# Patient Record
Sex: FEMALE | Race: WHITE | NOT HISPANIC OR LATINO | Employment: OTHER | URBAN - METROPOLITAN AREA
[De-identification: names, ages, dates, MRNs, and addresses within clinical notes are randomized per-mention and may not be internally consistent; named-entity substitution may affect disease eponyms.]

---

## 2023-02-11 PROBLEM — K59.09 CHRONIC CONSTIPATION: Status: ACTIVE | Noted: 2023-02-11

## 2023-02-11 PROBLEM — B00.1 RECURRENT COLD SORES: Status: ACTIVE | Noted: 2023-02-11

## 2023-02-11 PROBLEM — K21.9 ACID REFLUX: Status: ACTIVE | Noted: 2023-02-11

## 2023-02-11 PROBLEM — Q66.89 CLAW TOE: Status: ACTIVE | Noted: 2023-02-11

## 2023-02-11 PROBLEM — I73.00 RAYNAUD'S SYNDROME: Status: ACTIVE | Noted: 2023-02-11

## 2023-02-11 PROBLEM — N32.81 OAB (OVERACTIVE BLADDER): Status: ACTIVE | Noted: 2023-02-11

## 2023-02-11 PROBLEM — M54.50 LUMBAR SPINE PAIN: Status: ACTIVE | Noted: 2023-02-11

## 2023-02-11 PROBLEM — G62.9 PERIPHERAL NEUROPATHY: Status: ACTIVE | Noted: 2023-02-11

## 2023-02-11 PROBLEM — R19.8 IRREGULAR BOWEL HABITS: Status: ACTIVE | Noted: 2023-02-11

## 2023-02-11 PROBLEM — R29.91 ABNORMAL FOOT FINDING: Status: ACTIVE | Noted: 2023-02-11

## 2023-02-11 PROBLEM — B37.2 CUTANEOUS CANDIDIASIS: Status: ACTIVE | Noted: 2023-02-11

## 2023-02-11 PROBLEM — L65.9 HAIR THINNING: Status: ACTIVE | Noted: 2023-02-11

## 2023-02-11 PROBLEM — F51.05 INSOMNIA DUE TO OTHER MENTAL DISORDER: Status: ACTIVE | Noted: 2023-02-11

## 2023-02-11 PROBLEM — M10.9 GOUT: Status: ACTIVE | Noted: 2023-02-11

## 2023-02-11 PROBLEM — R20.0 NUMBNESS OF RIGHT LOWER EXTREMITY: Status: ACTIVE | Noted: 2023-02-11

## 2023-02-11 PROBLEM — F99 INSOMNIA DUE TO OTHER MENTAL DISORDER: Status: ACTIVE | Noted: 2023-02-11

## 2023-02-11 PROBLEM — M19.90 ARTHRITIS: Status: ACTIVE | Noted: 2023-02-11

## 2023-02-11 PROBLEM — G35 MULTIPLE SCLEROSIS (MULTI): Status: ACTIVE | Noted: 2023-02-11

## 2023-02-11 PROBLEM — T14.8XXA BRUISING: Status: ACTIVE | Noted: 2023-02-11

## 2023-02-11 RX ORDER — ASPIRIN 81 MG/1
1 TABLET ORAL DAILY
COMMUNITY
Start: 2021-06-30

## 2023-02-11 RX ORDER — ESTRADIOL 0.1 MG/G
CREAM VAGINAL
COMMUNITY
Start: 2022-03-21

## 2023-02-11 RX ORDER — CELECOXIB 200 MG/1
200 CAPSULE ORAL
COMMUNITY
Start: 2021-06-30 | End: 2023-03-08 | Stop reason: DRUGHIGH

## 2023-02-11 RX ORDER — VALACYCLOVIR HYDROCHLORIDE 1 G/1
1 TABLET, FILM COATED ORAL DAILY
COMMUNITY
Start: 2021-06-30

## 2023-02-11 RX ORDER — NYSTATIN 100000 [USP'U]/G
POWDER TOPICAL
COMMUNITY
Start: 2020-10-12

## 2023-02-11 RX ORDER — PANTOPRAZOLE SODIUM 40 MG/1
40 TABLET, DELAYED RELEASE ORAL 2 TIMES DAILY
COMMUNITY
Start: 2021-06-30 | End: 2023-07-25

## 2023-02-11 RX ORDER — CALCIUM CITRATE/MAGNESIUM/D3 250 MG-200
WAFER ORAL
COMMUNITY
Start: 2021-06-30

## 2023-02-11 RX ORDER — POLYETHYLENE GLYCOL 3350 17 G/17G
POWDER, FOR SOLUTION ORAL
COMMUNITY
Start: 2022-09-12 | End: 2023-03-08 | Stop reason: SDUPTHER

## 2023-02-11 RX ORDER — CLONAZEPAM 1 MG/1
0.5 TABLET ORAL NIGHTLY
COMMUNITY
Start: 2021-06-30 | End: 2023-03-08 | Stop reason: SDUPTHER

## 2023-02-11 RX ORDER — MIRABEGRON 25 MG/1
1 TABLET, FILM COATED, EXTENDED RELEASE ORAL DAILY
COMMUNITY
Start: 2022-10-31 | End: 2023-05-04 | Stop reason: SDUPTHER

## 2023-02-11 RX ORDER — INTERFERON BETA-1A 30MCG/.5ML
30 KIT INTRAMUSCULAR
COMMUNITY
Start: 2021-07-09

## 2023-02-11 RX ORDER — PREGABALIN 75 MG/1
CAPSULE ORAL
COMMUNITY
Start: 2021-06-30 | End: 2023-04-18 | Stop reason: SDUPTHER

## 2023-02-11 RX ORDER — MULTIVIT WITH MINERALS/HERBS
TABLET ORAL
COMMUNITY
Start: 2021-06-30

## 2023-02-11 RX ORDER — NICARDIPINE HYDROCHLORIDE 20 MG/1
1 CAPSULE ORAL 3 TIMES DAILY
COMMUNITY
Start: 2021-06-30

## 2023-02-11 RX ORDER — AMITRIPTYLINE HYDROCHLORIDE 25 MG/1
1 TABLET, FILM COATED ORAL NIGHTLY
COMMUNITY
Start: 2021-06-30 | End: 2023-05-16

## 2023-03-08 ENCOUNTER — OFFICE VISIT (OUTPATIENT)
Dept: PRIMARY CARE | Facility: CLINIC | Age: 73
End: 2023-03-08
Payer: MEDICARE

## 2023-03-08 VITALS
RESPIRATION RATE: 16 BRPM | TEMPERATURE: 97.2 F | DIASTOLIC BLOOD PRESSURE: 78 MMHG | WEIGHT: 207 LBS | SYSTOLIC BLOOD PRESSURE: 124 MMHG | BODY MASS INDEX: 40.64 KG/M2 | HEART RATE: 64 BPM | HEIGHT: 60 IN | OXYGEN SATURATION: 97 %

## 2023-03-08 DIAGNOSIS — M10.012 IDIOPATHIC GOUT OF LEFT SHOULDER, UNSPECIFIED CHRONICITY: ICD-10-CM

## 2023-03-08 DIAGNOSIS — N32.81 OAB (OVERACTIVE BLADDER): ICD-10-CM

## 2023-03-08 DIAGNOSIS — K21.9 GASTROESOPHAGEAL REFLUX DISEASE, UNSPECIFIED WHETHER ESOPHAGITIS PRESENT: ICD-10-CM

## 2023-03-08 DIAGNOSIS — G35 MULTIPLE SCLEROSIS (MULTI): ICD-10-CM

## 2023-03-08 DIAGNOSIS — E66.01 OBESITY, MORBID (MULTI): ICD-10-CM

## 2023-03-08 DIAGNOSIS — F99 INSOMNIA DUE TO OTHER MENTAL DISORDER: ICD-10-CM

## 2023-03-08 DIAGNOSIS — B00.1 RECURRENT COLD SORES: ICD-10-CM

## 2023-03-08 DIAGNOSIS — B37.2 CUTANEOUS CANDIDIASIS: ICD-10-CM

## 2023-03-08 DIAGNOSIS — G63 POLYNEUROPATHY ASSOCIATED WITH UNDERLYING DISEASE (MULTI): Primary | ICD-10-CM

## 2023-03-08 DIAGNOSIS — I73.00 RAYNAUD'S DISEASE WITHOUT GANGRENE: ICD-10-CM

## 2023-03-08 DIAGNOSIS — F51.05 INSOMNIA DUE TO OTHER MENTAL DISORDER: ICD-10-CM

## 2023-03-08 PROCEDURE — 1036F TOBACCO NON-USER: CPT | Performed by: FAMILY MEDICINE

## 2023-03-08 PROCEDURE — 1160F RVW MEDS BY RX/DR IN RCRD: CPT | Performed by: FAMILY MEDICINE

## 2023-03-08 PROCEDURE — 1159F MED LIST DOCD IN RCRD: CPT | Performed by: FAMILY MEDICINE

## 2023-03-08 PROCEDURE — 99214 OFFICE O/P EST MOD 30 MIN: CPT | Performed by: FAMILY MEDICINE

## 2023-03-08 RX ORDER — CELECOXIB 200 MG/1
200 CAPSULE ORAL DAILY
Qty: 90 CAPSULE | Refills: 0 | Status: SHIPPED | OUTPATIENT
Start: 2023-03-08 | End: 2023-06-06

## 2023-03-08 RX ORDER — CLOTRIMAZOLE AND BETAMETHASONE DIPROPIONATE 10; .64 MG/G; MG/G
CREAM TOPICAL
COMMUNITY
Start: 2023-01-03 | End: 2023-04-12 | Stop reason: ALTCHOICE

## 2023-03-08 RX ORDER — FLUCONAZOLE 100 MG/1
100 TABLET ORAL DAILY
Qty: 30 TABLET | Refills: 0 | Status: SHIPPED | OUTPATIENT
Start: 2023-03-08 | End: 2023-04-04 | Stop reason: ALTCHOICE

## 2023-03-08 RX ORDER — MULTIVITAMIN
1 TABLET ORAL DAILY
COMMUNITY
End: 2023-03-08

## 2023-03-08 ASSESSMENT — PATIENT HEALTH QUESTIONNAIRE - PHQ9
2. FEELING DOWN, DEPRESSED OR HOPELESS: NOT AT ALL
1. LITTLE INTEREST OR PLEASURE IN DOING THINGS: NOT AT ALL
SUM OF ALL RESPONSES TO PHQ9 QUESTIONS 1 AND 2: 0

## 2023-03-08 ASSESSMENT — ENCOUNTER SYMPTOMS
JOINT SWELLING: 0
VOMITING: 0
FLANK PAIN: 0
UNEXPECTED WEIGHT CHANGE: 0
FEVER: 0
TROUBLE SWALLOWING: 0
DIARRHEA: 0
APPETITE CHANGE: 0
NERVOUS/ANXIOUS: 0
WOUND: 0
DEPRESSION: 0
OCCASIONAL FEELINGS OF UNSTEADINESS: 0
DIZZINESS: 0
EYE ITCHING: 0
SINUS PAIN: 0
ACTIVITY CHANGE: 0
POLYDIPSIA: 0
SHORTNESS OF BREATH: 0
COLOR CHANGE: 0
CHILLS: 0
HEMATURIA: 0
COUGH: 0
FATIGUE: 0
SINUS PRESSURE: 0
SORE THROAT: 0
AGITATION: 0
FACIAL SWELLING: 0
PALPITATIONS: 0
MYALGIAS: 0
CONSTIPATION: 0
FREQUENCY: 0
EYE PAIN: 0
POLYPHAGIA: 0
LOSS OF SENSATION IN FEET: 0
CHEST TIGHTNESS: 0
BACK PAIN: 0
ADENOPATHY: 0
NECK STIFFNESS: 0
ABDOMINAL PAIN: 0
NAUSEA: 0
BLOOD IN STOOL: 0
WHEEZING: 0
RHINORRHEA: 0
SLEEP DISTURBANCE: 0
DIAPHORESIS: 0
VOICE CHANGE: 0
DYSURIA: 0
EYE DISCHARGE: 0
BRUISES/BLEEDS EASILY: 0
EYE REDNESS: 0
ARTHRALGIAS: 0

## 2023-03-08 NOTE — PROGRESS NOTES
Subjective   Patient ID: Olga Bennett is a 72 y.o. female who presents for Follow-up (Review medications).  Today she is accompanied by accompanied by spouse.     HPI    Review of Systems   Constitutional:  Negative for activity change, appetite change, chills, diaphoresis, fatigue, fever and unexpected weight change.   HENT:  Negative for congestion, dental problem, drooling, ear discharge, ear pain, facial swelling, hearing loss, nosebleeds, postnasal drip, rhinorrhea, sinus pressure, sinus pain, sneezing, sore throat, tinnitus, trouble swallowing and voice change.    Eyes:  Negative for pain, discharge, redness, itching and visual disturbance.   Respiratory:  Negative for cough, chest tightness, shortness of breath and wheezing.    Cardiovascular:  Negative for chest pain, palpitations and leg swelling.   Gastrointestinal:  Negative for abdominal pain, blood in stool, constipation, diarrhea, nausea and vomiting.   Endocrine: Negative for cold intolerance, heat intolerance, polydipsia, polyphagia and polyuria.   Genitourinary:  Negative for decreased urine volume, dysuria, flank pain, frequency, hematuria and urgency.   Musculoskeletal:  Negative for arthralgias, back pain, gait problem, joint swelling, myalgias and neck stiffness.   Skin:  Negative for color change, pallor, rash and wound.   Neurological:  Negative for dizziness.   Hematological:  Negative for adenopathy. Does not bruise/bleed easily.   Psychiatric/Behavioral:  Negative for agitation, behavioral problems and sleep disturbance. The patient is not nervous/anxious.        Objective   /78 (BP Location: Left arm, Patient Position: Sitting, BP Cuff Size: Adult)   Pulse 64   Temp 36.2 °C (97.2 °F)   Resp 16   Ht 1.524 m (5')   Wt 93.9 kg (207 lb)   SpO2 97%   BMI 40.43 kg/m²   BSA: 1.99 meters squared  Growth percentiles: Facility age limit for growth %kamran is 20 years. Facility age limit for growth %kamran is 20 years.     Physical  Exam  Vitals and nursing note reviewed.   Constitutional:       General: She is not in acute distress.     Appearance: Normal appearance. She is normal weight. She is not ill-appearing or toxic-appearing.   HENT:      Head: Normocephalic.      Right Ear: Tympanic membrane, ear canal and external ear normal. There is no impacted cerumen.      Left Ear: Tympanic membrane, ear canal and external ear normal. There is no impacted cerumen.      Nose: Nose normal. No congestion or rhinorrhea.      Mouth/Throat:      Mouth: Mucous membranes are moist.      Pharynx: Oropharynx is clear. No oropharyngeal exudate or posterior oropharyngeal erythema.   Eyes:      General: No scleral icterus.        Right eye: No discharge.         Left eye: No discharge.      Extraocular Movements: Extraocular movements intact.      Conjunctiva/sclera: Conjunctivae normal.      Pupils: Pupils are equal, round, and reactive to light.   Neck:      Vascular: No carotid bruit.   Cardiovascular:      Rate and Rhythm: Normal rate and regular rhythm.      Pulses: Normal pulses.      Heart sounds: Normal heart sounds. No murmur heard.     No gallop.   Pulmonary:      Effort: No respiratory distress.      Breath sounds: No wheezing or rhonchi.   Chest:      Chest wall: No tenderness.   Abdominal:      General: Abdomen is flat. Bowel sounds are normal.      Palpations: Abdomen is soft. There is no mass.      Tenderness: There is no abdominal tenderness. There is no guarding or rebound.      Hernia: No hernia is present.   Musculoskeletal:         General: No swelling or tenderness. Normal range of motion.      Cervical back: Normal range of motion. No rigidity or tenderness.      Right lower leg: No edema.      Left lower leg: No edema.   Lymphadenopathy:      Cervical: No cervical adenopathy.   Skin:     General: Skin is warm and dry.      Coloration: Skin is not pale.      Findings: No bruising, erythema or rash.   Neurological:      General: No focal  deficit present.      Mental Status: She is alert and oriented to person, place, and time.      Sensory: No sensory deficit.      Coordination: Coordination normal.      Gait: Gait normal.      Deep Tendon Reflexes: Reflexes normal.   Psychiatric:         Mood and Affect: Mood normal.         Behavior: Behavior normal.         Thought Content: Thought content normal.         Judgment: Judgment normal.         Assessment/Plan   Problem List Items Addressed This Visit       Acid reflux    Cutaneous candidiasis    Insomnia due to other mental disorder    Multiple sclerosis (CMS/[facility])    OAB (overactive bladder)    Peripheral neuropathy - Primary    Raynaud's syndrome    Recurrent cold sores    Gout    Obesity, morbid (CMS/[facility])

## 2023-03-14 DIAGNOSIS — G62.9 PERIPHERAL POLYNEUROPATHY: ICD-10-CM

## 2023-03-14 DIAGNOSIS — G35 MULTIPLE SCLEROSIS (MULTI): ICD-10-CM

## 2023-03-27 ENCOUNTER — PATIENT OUTREACH (OUTPATIENT)
Dept: PRIMARY CARE | Facility: CLINIC | Age: 73
End: 2023-03-27
Payer: MEDICARE

## 2023-03-27 DIAGNOSIS — G35 MULTIPLE SCLEROSIS (MULTI): ICD-10-CM

## 2023-03-27 DIAGNOSIS — G63 POLYNEUROPATHY ASSOCIATED WITH UNDERLYING DISEASE (MULTI): ICD-10-CM

## 2023-03-27 NOTE — PROGRESS NOTES
[facility] call with patient. Discussed medical diagnoses and any current issues. Introduced the program and explained my role in the office. Patient does have MS and peripheral neuropathy. She is doing much better working with pain management and her pain is almost gone. She has not had a flare up of her MS in 4-5 years. Was diagnosed back in 1995/96. Patient is independent with ADL's. She is not having issues other than waiting on hold whenever she calls the . Also she has issues with getting medication refills. It takes days sometimes and she doesn't hear back as to whether this was addressed. I looked at her insurance and it appears she would have a $15 copay a month. In my opinion, this is more of an office workflow issue vs a chronic care issue. She can certainly keep my number in case any other needs arise, but at this time we agreed she would not consent.

## 2023-03-30 ENCOUNTER — TELEPHONE (OUTPATIENT)
Dept: PRIMARY CARE | Facility: CLINIC | Age: 73
End: 2023-03-30
Payer: MEDICARE

## 2023-04-04 DIAGNOSIS — R21 RASH: ICD-10-CM

## 2023-04-04 RX ORDER — HYDROXYZINE HYDROCHLORIDE 25 MG/1
25 TABLET, FILM COATED ORAL EVERY 6 HOURS PRN
Qty: 30 TABLET | Refills: 0 | Status: CANCELLED | OUTPATIENT
Start: 2023-04-04 | End: 2023-05-04

## 2023-04-04 RX ORDER — HYDROXYZINE HYDROCHLORIDE 25 MG/1
25 TABLET, FILM COATED ORAL EVERY 6 HOURS PRN
Qty: 30 TABLET | Refills: 0 | Status: SHIPPED | OUTPATIENT
Start: 2023-04-04 | End: 2023-04-12 | Stop reason: ALTCHOICE

## 2023-04-04 RX ORDER — PREDNISONE 20 MG/1
20 TABLET ORAL DAILY
Qty: 7 TABLET | Refills: 0 | Status: CANCELLED | OUTPATIENT
Start: 2023-04-04 | End: 2023-04-11

## 2023-04-04 RX ORDER — PREDNISONE 20 MG/1
20 TABLET ORAL DAILY
Qty: 7 TABLET | Refills: 0 | Status: SHIPPED | OUTPATIENT
Start: 2023-04-04 | End: 2023-04-12 | Stop reason: ALTCHOICE

## 2023-04-05 NOTE — TELEPHONE ENCOUNTER
Sent My Chart message to pt to contact office if still having issues.  Has appt next week in office

## 2023-04-11 ENCOUNTER — CLINICAL SUPPORT (OUTPATIENT)
Dept: PRIMARY CARE | Facility: CLINIC | Age: 73
End: 2023-04-11
Payer: MEDICARE

## 2023-04-11 DIAGNOSIS — Z79.899 POLYPHARMACY: Primary | ICD-10-CM

## 2023-04-11 DIAGNOSIS — G35 MULTIPLE SCLEROSIS (MULTI): ICD-10-CM

## 2023-04-11 NOTE — PROGRESS NOTES
Subjective   Patient ID: Olga Bennett is a 72 y.o. female who presents for polypharmacy.    Referring Provider: Wendy Meraz MD     Patient was referred by PCP at most recent visit for review of medications. She is on several meds and may benefit from drug holiday/discontinuation of medications. She mentions to me that she may not notice significant differences while taking biotin, turmeric. She has tried in the past to cut out pantoprazole but GERD is too painful when stopping. She has pain relief from celebrex and lyrica. She is not sure whether the sleep benefit of amitriptyline is noticeable. No other concerns at this time, not on atarax and prednisone.     Objective     There were no vitals taken for this visit.     Labs  Lab Results   Component Value Date    BILITOT 0.4 12/12/2022    CALCIUM 10.3 12/12/2022    CO2 27 12/12/2022     12/12/2022    CREATININE 0.83 12/12/2022    GLUCOSE 99 12/12/2022    ALKPHOS 60 12/12/2022    K 4.6 12/12/2022    PROT 7.7 12/12/2022     12/12/2022    AST 42 (H) 12/12/2022    ALT 51 (H) 12/12/2022    BUN 18 12/12/2022    ANIONGAP 13 12/12/2022    MG 1.73 06/27/2022    ALBUMIN 4.6 12/12/2022    GFRF 75 12/12/2022     Lab Results   Component Value Date    TRIG 161 (H) 06/27/2022    CHOL 163 06/27/2022    HDL 50.8 06/27/2022     No results found for: HGBA1C    Current Outpatient Medications on File Prior to Visit   Medication Sig Dispense Refill    amitriptyline (Elavil) 25 mg tablet Take 1 tablet (25 mg) by mouth once daily at bedtime.      aspirin 81 mg EC tablet Take 1 tablet (81 mg) by mouth once daily.      BIOTIN ORAL Take 1 tablet by mouth 1 (one) time each day. 7500 MCG      calcium citrate/magnesium/D3 (seble citrate-mag ox,aspart-D3) 250 mg-125 mg- 200 unit wafer Calcium-Magnesium-Vitamin D 250-125-200 MG-MG-UNIT Oral Wafer   Refills: 0        Brendon ARTEAGA-Mag HORACIOgie   Start : 30-Jun-2021   Active      celecoxib (CeleBREX) 200 mg capsule Take 1 capsule (200  mg) by mouth once daily. 90 capsule 0    estradiol (Estrace) 0.01 % (0.1 mg/gram) vaginal cream Estradiol 0.1 MG/GM Vaginal Cream   Quantity: 42  Refills: 0        Start : 21-Mar-2022   Active      interferon beta-1a (Avonex) 30 mcg/0.5 mL injection Inject 0.5 mL (30 mcg) into the shoulder, thigh, or buttocks 1 (one) time per week. For MS      mirabegron (Mybetriq) 25 mg tablet extended release 24 hr 24 hr tablet Take 1 tablet (25 mg) by mouth once daily.      niCARdipine (Cardene) 20 mg capsule Take 1 capsule (20 mg) by mouth in the morning and 1 capsule (20 mg) in the evening and 1 capsule (20 mg) before bedtime.      nystatin (Mycostatin) 100,000 unit/gram powder APPLY 2-3 TIMES DAILY TO AFFECTED AREA(S) PRN      pantoprazole (ProtoNix) 40 mg EC tablet Take 1 tablet (40 mg) by mouth in the morning and 1 tablet (40 mg) in the evening. 30 minutes before breakfast and dinner..      pregabalin (Lyrica) 75 mg capsule Take 1 capsule with breakfast, lunch and dinner and 2 capsules at bedtime.      TURMERIC ORAL Turmeric Curcumin Oral Capsule   Refills: 0        Graciela Cohen   Start : 30-Jun-2021   Active      valACYclovir (Valtrex) 1 gram tablet Take 1 tablet (1,000 mg) by mouth once daily.      vitamin B complex tablet Super B-Complex Oral Tablet   Refills: 0        Graciela Cohen   Start : 30-Jun-2021   Active      clotrimazole-betamethasone (Lotrisone) cream       hydrOXYzine HCL (Atarax) 25 mg tablet Take 1 tablet (25 mg) by mouth every 6 hours if needed for itching. (Patient not taking: Reported on 4/11/2023) 30 tablet 0    predniSONE (Deltasone) 20 mg tablet Take 1 tablet (20 mg) by mouth once daily for 7 doses. 7 tablet 0     No current facility-administered medications on file prior to visit.       Assessment/Plan   Problem List Items Addressed This Visit       Multiple sclerosis (CMS/HCC)    Polypharmacy - Primary     Medications that may be beneficial to discontinue and wean  off:  Amitriptyline 25 mg tabs: Patient is using for sleep. She would consider discontinuing this, would be open to cutting in half x 2 weeks and if tolerating, discontinuing alltogether after this  Biotin oral caps: Patient to trial stop taking  Hydroxyzine 25 mg tabs: Patient is not taking at this time  Prednisone 20 mg tabs: Patient is not taking at this time  Turmeric oral: Patient is not noticing significant benefit of this medication, plans on doing a drug holiday to see if she notices any change in pain  2.   Medications such as celecoxib, pantoprazole and Lyrica are all meds patient mentions that benefit her more than stopping and would like to continue  3.   Continue all other meds at this time  4.   Pharmacy follow-up as needed, patient provided number.    Thanks,  Emmanuel Estrada, PharmD

## 2023-04-12 ENCOUNTER — OFFICE VISIT (OUTPATIENT)
Dept: PRIMARY CARE | Facility: CLINIC | Age: 73
End: 2023-04-12
Payer: MEDICARE

## 2023-04-12 VITALS
DIASTOLIC BLOOD PRESSURE: 77 MMHG | HEART RATE: 80 BPM | WEIGHT: 206.8 LBS | SYSTOLIC BLOOD PRESSURE: 132 MMHG | RESPIRATION RATE: 18 BRPM | OXYGEN SATURATION: 96 % | HEIGHT: 61 IN | BODY MASS INDEX: 39.04 KG/M2

## 2023-04-12 DIAGNOSIS — Z71.89 CARDIAC RISK COUNSELING: ICD-10-CM

## 2023-04-12 DIAGNOSIS — F33.8 SEASONAL AFFECTIVE DISORDER (CMS-HCC): ICD-10-CM

## 2023-04-12 DIAGNOSIS — Z72.89 OTHER PROBLEMS RELATED TO LIFESTYLE: ICD-10-CM

## 2023-04-12 DIAGNOSIS — Z13.220 SCREENING FOR HYPERLIPIDEMIA: ICD-10-CM

## 2023-04-12 DIAGNOSIS — F33.9 CHRONIC RECURRENT MAJOR DEPRESSIVE DISORDER (CMS-HCC): ICD-10-CM

## 2023-04-12 DIAGNOSIS — Z13.6 SCREENING FOR CARDIOVASCULAR CONDITION: ICD-10-CM

## 2023-04-12 DIAGNOSIS — Z12.11 SCREENING FOR COLORECTAL CANCER: ICD-10-CM

## 2023-04-12 DIAGNOSIS — G35 MULTIPLE SCLEROSIS (MULTI): ICD-10-CM

## 2023-04-12 DIAGNOSIS — E66.01 CLASS 2 SEVERE OBESITY WITH SERIOUS COMORBIDITY AND BODY MASS INDEX (BMI) OF 39.0 TO 39.9 IN ADULT, UNSPECIFIED OBESITY TYPE (MULTI): ICD-10-CM

## 2023-04-12 DIAGNOSIS — E66.01 CLASS 2 SEVERE OBESITY DUE TO EXCESS CALORIES WITH SERIOUS COMORBIDITY AND BODY MASS INDEX (BMI) OF 39.0 TO 39.9 IN ADULT (MULTI): ICD-10-CM

## 2023-04-12 DIAGNOSIS — Z12.12 SCREENING FOR COLORECTAL CANCER: ICD-10-CM

## 2023-04-12 DIAGNOSIS — Z00.00 ROUTINE GENERAL MEDICAL EXAMINATION AT HEALTH CARE FACILITY: Primary | ICD-10-CM

## 2023-04-12 DIAGNOSIS — Z23 NEED FOR VACCINATION: ICD-10-CM

## 2023-04-12 DIAGNOSIS — Z13.89 SCREENING FOR MULTIPLE CONDITIONS: ICD-10-CM

## 2023-04-12 DIAGNOSIS — Z11.59 NEED FOR HEPATITIS C SCREENING TEST: ICD-10-CM

## 2023-04-12 PROBLEM — T14.8XXA BRUISING: Status: RESOLVED | Noted: 2023-02-11 | Resolved: 2023-04-12

## 2023-04-12 PROBLEM — G62.9 PERIPHERAL NEUROPATHY: Status: RESOLVED | Noted: 2023-02-11 | Resolved: 2023-04-12

## 2023-04-12 PROBLEM — R19.8 IRREGULAR BOWEL HABITS: Status: RESOLVED | Noted: 2023-02-11 | Resolved: 2023-04-12

## 2023-04-12 PROBLEM — N31.9 NEUROGENIC DYSFUNCTION OF THE URINARY BLADDER: Status: ACTIVE | Noted: 2023-04-12

## 2023-04-12 PROBLEM — G57.93 NEUROPATHY INVOLVING BOTH LOWER EXTREMITIES: Status: ACTIVE | Noted: 2023-04-12

## 2023-04-12 PROBLEM — B37.2 CUTANEOUS CANDIDIASIS: Status: RESOLVED | Noted: 2023-02-11 | Resolved: 2023-04-12

## 2023-04-12 PROBLEM — M62.81 MUSCLE WEAKNESS: Status: ACTIVE | Noted: 2023-04-12

## 2023-04-12 PROBLEM — G89.29 CHRONIC BILATERAL LOW BACK PAIN WITH BILATERAL SCIATICA: Status: ACTIVE | Noted: 2023-02-11

## 2023-04-12 PROBLEM — R29.91 ABNORMAL FOOT FINDING: Status: RESOLVED | Noted: 2023-02-11 | Resolved: 2023-04-12

## 2023-04-12 PROBLEM — M54.42 CHRONIC BILATERAL LOW BACK PAIN WITH BILATERAL SCIATICA: Status: ACTIVE | Noted: 2023-02-11

## 2023-04-12 PROBLEM — R20.2 PARESTHESIA: Status: RESOLVED | Noted: 2023-04-12 | Resolved: 2023-04-12

## 2023-04-12 PROBLEM — F51.05 INSOMNIA DUE TO OTHER MENTAL DISORDER: Status: RESOLVED | Noted: 2023-02-11 | Resolved: 2023-04-12

## 2023-04-12 PROBLEM — L65.9 HAIR THINNING: Status: RESOLVED | Noted: 2023-02-11 | Resolved: 2023-04-12

## 2023-04-12 PROBLEM — M62.81 MUSCLE WEAKNESS: Status: RESOLVED | Noted: 2023-04-12 | Resolved: 2023-04-12

## 2023-04-12 PROBLEM — R26.0 ATAXIC GAIT: Status: RESOLVED | Noted: 2023-04-12 | Resolved: 2023-04-12

## 2023-04-12 PROBLEM — M54.41 CHRONIC BILATERAL LOW BACK PAIN WITH BILATERAL SCIATICA: Status: ACTIVE | Noted: 2023-02-11

## 2023-04-12 PROBLEM — F32.9 MAJOR DEPRESSION, SINGLE EPISODE: Status: ACTIVE | Noted: 2023-04-12

## 2023-04-12 PROBLEM — M1A.09X0 IDIOPATHIC CHRONIC GOUT OF MULTIPLE SITES WITHOUT TOPHUS: Status: ACTIVE | Noted: 2023-02-11

## 2023-04-12 PROBLEM — R20.0 NUMBNESS OF RIGHT LOWER EXTREMITY: Status: RESOLVED | Noted: 2023-02-11 | Resolved: 2023-04-12

## 2023-04-12 PROBLEM — R20.2 PARESTHESIA: Status: ACTIVE | Noted: 2023-04-12

## 2023-04-12 PROBLEM — F99 INSOMNIA DUE TO OTHER MENTAL DISORDER: Status: RESOLVED | Noted: 2023-02-11 | Resolved: 2023-04-12

## 2023-04-12 PROBLEM — R27.0 ATAXIA: Status: RESOLVED | Noted: 2023-04-12 | Resolved: 2023-04-12

## 2023-04-12 PROBLEM — R26.0 ATAXIC GAIT: Status: ACTIVE | Noted: 2023-04-12

## 2023-04-12 PROBLEM — R27.0 ATAXIA: Status: ACTIVE | Noted: 2023-04-12

## 2023-04-12 LAB
ANION GAP IN SER/PLAS: 11 MMOL/L (ref 10–20)
CALCIUM (MG/DL) IN SER/PLAS: 9.2 MG/DL (ref 8.6–10.3)
CARBON DIOXIDE, TOTAL (MMOL/L) IN SER/PLAS: 27 MMOL/L (ref 21–32)
CHLORIDE (MMOL/L) IN SER/PLAS: 107 MMOL/L (ref 98–107)
CREATININE (MG/DL) IN SER/PLAS: 0.8 MG/DL (ref 0.5–1.05)
GFR FEMALE: 78 ML/MIN/1.73M2
GLUCOSE (MG/DL) IN SER/PLAS: 90 MG/DL (ref 74–99)
POTASSIUM (MMOL/L) IN SER/PLAS: 3.7 MMOL/L (ref 3.5–5.3)
SODIUM (MMOL/L) IN SER/PLAS: 141 MMOL/L (ref 136–145)
URATE (MG/DL) IN SER/PLAS: 6.3 MG/DL (ref 2.3–6.7)
UREA NITROGEN (MG/DL) IN SER/PLAS: 20 MG/DL (ref 6–23)

## 2023-04-12 PROCEDURE — 99497 ADVNCD CARE PLAN 30 MIN: CPT | Performed by: FAMILY MEDICINE

## 2023-04-12 PROCEDURE — G0009 ADMIN PNEUMOCOCCAL VACCINE: HCPCS | Performed by: FAMILY MEDICINE

## 2023-04-12 PROCEDURE — 90677 PCV20 VACCINE IM: CPT | Performed by: FAMILY MEDICINE

## 2023-04-12 PROCEDURE — 93000 ELECTROCARDIOGRAM COMPLETE: CPT | Performed by: FAMILY MEDICINE

## 2023-04-12 PROCEDURE — G0439 PPPS, SUBSEQ VISIT: HCPCS | Performed by: FAMILY MEDICINE

## 2023-04-12 PROCEDURE — 1159F MED LIST DOCD IN RCRD: CPT | Performed by: FAMILY MEDICINE

## 2023-04-12 PROCEDURE — 1160F RVW MEDS BY RX/DR IN RCRD: CPT | Performed by: FAMILY MEDICINE

## 2023-04-12 PROCEDURE — 1036F TOBACCO NON-USER: CPT | Performed by: FAMILY MEDICINE

## 2023-04-12 PROCEDURE — 3008F BODY MASS INDEX DOCD: CPT | Performed by: FAMILY MEDICINE

## 2023-04-12 PROCEDURE — 1170F FXNL STATUS ASSESSED: CPT | Performed by: FAMILY MEDICINE

## 2023-04-12 RX ORDER — CLONAZEPAM 1 MG/1
1 TABLET ORAL DAILY
COMMUNITY
Start: 2017-01-10 | End: 2023-05-04 | Stop reason: ALTCHOICE

## 2023-04-12 ASSESSMENT — ENCOUNTER SYMPTOMS
EYE DISCHARGE: 0
DEPRESSION: 0
MYALGIAS: 1
SINUS PAIN: 0
CONSTIPATION: 1
POLYDIPSIA: 1
NUMBNESS: 1
ARTHRALGIAS: 1
HEMATURIA: 0
BACK PAIN: 1
VOMITING: 0
AGITATION: 0
COUGH: 0
OCCASIONAL FEELINGS OF UNSTEADINESS: 0
EYE ITCHING: 0
JOINT SWELLING: 0
FLANK PAIN: 0
DYSURIA: 0
SORE THROAT: 0
BRUISES/BLEEDS EASILY: 0
FATIGUE: 0
HEADACHES: 0
COLOR CHANGE: 0
APPETITE CHANGE: 0
LIGHT-HEADEDNESS: 0
FREQUENCY: 0
DIZZINESS: 0
POLYPHAGIA: 0
WOUND: 0
ACTIVITY CHANGE: 0
BLOOD IN STOOL: 0
TROUBLE SWALLOWING: 0
PALPITATIONS: 0
NAUSEA: 0
SLEEP DISTURBANCE: 0
DIARRHEA: 0
NERVOUS/ANXIOUS: 0
CHEST TIGHTNESS: 0
DIAPHORESIS: 0
RHINORRHEA: 0
VOICE CHANGE: 1
NECK STIFFNESS: 0
SHORTNESS OF BREATH: 0
FACIAL SWELLING: 0
EYE PAIN: 0
LOSS OF SENSATION IN FEET: 1
CHILLS: 0
UNEXPECTED WEIGHT CHANGE: 0
ADENOPATHY: 0
ABDOMINAL PAIN: 0
FEVER: 0
SINUS PRESSURE: 0
WHEEZING: 0
EYE REDNESS: 0

## 2023-04-12 ASSESSMENT — PATIENT HEALTH QUESTIONNAIRE - PHQ9
1. LITTLE INTEREST OR PLEASURE IN DOING THINGS: NOT AT ALL
2. FEELING DOWN, DEPRESSED OR HOPELESS: NOT AT ALL
SUM OF ALL RESPONSES TO PHQ9 QUESTIONS 1 & 2: 0

## 2023-04-12 ASSESSMENT — ACTIVITIES OF DAILY LIVING (ADL)
BATHING: INDEPENDENT
MANAGING_FINANCES: INDEPENDENT
DRESSING: INDEPENDENT
GROCERY_SHOPPING: INDEPENDENT
TAKING_MEDICATION: INDEPENDENT
DOING_HOUSEWORK: INDEPENDENT

## 2023-04-12 NOTE — PROGRESS NOTES
Subjective   Reason for Visit: Olga Bennett is an 72 y.o. female here for a Medicare Wellness visit.     Past Medical, Surgical, and Family History reviewed and updated in chart.    Reviewed all medications by prescribing practitioner or clinical pharmacist (such as prescriptions, OTCs, herbal therapies and supplements) and documented in the medical record.    HPI    Patient Care Team:  Wendy Meraz MD as PCP - General  Wendy Meraz MD as PCP - [employer]age PCP   Advance Care Planning Note     Discussion Date: 04/12/23   Discussion Participants: patient    The patient wishes to discuss Advance Care Planning today and the following is a brief summary of our discussion.     Patient has capacity to make their own medical decisions: Yes  Health Care Agent/Surrogate Decision Maker documented in chart: Yes    Documents on file and valid:  Advance Directive/Living Will: Yes   Health Care Power of : Yes  Patient is a DNR CR    Communication of Medical Status/Pr  ognosis:   good     Communication of Treatment Goals/Options:   good     Treatment Decisions  Discussed and agree with her decision.    Time Statement: Total face to face time spent on advance care planning was 16 minutes with 16 minutes spent in counseling, including the explanation.    Wendy Meraz MD   Review of Systems   Constitutional:  Negative for activity change, appetite change, chills, diaphoresis, fatigue, fever and unexpected weight change.   HENT:  Positive for voice change. Negative for congestion, dental problem, drooling, ear discharge, ear pain, facial swelling, hearing loss, nosebleeds, postnasal drip, rhinorrhea, sinus pressure, sinus pain, sneezing, sore throat, tinnitus and trouble swallowing.         Some hoarseness in voice    Eyes:  Negative for pain, discharge, redness, itching and visual disturbance.   Respiratory:  Negative for cough, chest tightness, shortness of breath and wheezing.    Cardiovascular:   "Negative for chest pain, palpitations and leg swelling.   Gastrointestinal:  Positive for constipation. Negative for abdominal pain, blood in stool, diarrhea, nausea and vomiting.   Endocrine: Positive for polydipsia. Negative for cold intolerance, heat intolerance, polyphagia and polyuria.   Genitourinary:  Negative for decreased urine volume, dysuria, flank pain, frequency, hematuria and urgency.   Musculoskeletal:  Positive for arthralgias, back pain and myalgias. Negative for gait problem, joint swelling and neck stiffness.   Skin:  Positive for rash. Negative for color change, pallor and wound.        Neck rash - improving with use of Prednisone   Neurological:  Positive for numbness. Negative for dizziness, light-headedness and headaches.        Paresthesias in bilateral lower extremities R>L   Hematological:  Negative for adenopathy. Does not bruise/bleed easily.   Psychiatric/Behavioral:  Negative for agitation, behavioral problems and sleep disturbance. The patient is not nervous/anxious.          Objective   Vitals:  /77   Pulse 80   Resp 18   Ht 1.537 m (5' 0.5\")   Wt 93.8 kg (206 lb 12.8 oz)   SpO2 96%   BMI 39.72 kg/m²       Physical Exam  Vitals and nursing note reviewed.   Constitutional:       General: She is not in acute distress.     Appearance: Normal appearance. She is obese. She is not ill-appearing or toxic-appearing.   HENT:      Head: Normocephalic.      Right Ear: Tympanic membrane, ear canal and external ear normal. There is no impacted cerumen.      Left Ear: Tympanic membrane, ear canal and external ear normal. There is no impacted cerumen.      Nose: Nose normal. No congestion or rhinorrhea.      Mouth/Throat:      Mouth: Mucous membranes are moist.      Pharynx: Oropharynx is clear. No oropharyngeal exudate or posterior oropharyngeal erythema.   Eyes:      General: No scleral icterus.        Right eye: No discharge.         Left eye: No discharge.      Extraocular Movements: " Extraocular movements intact.      Conjunctiva/sclera: Conjunctivae normal.      Pupils: Pupils are equal, round, and reactive to light.   Neck:      Vascular: No carotid bruit.   Cardiovascular:      Rate and Rhythm: Normal rate and regular rhythm.      Pulses: Normal pulses.      Heart sounds: Normal heart sounds. No murmur heard.     No friction rub. No gallop.   Pulmonary:      Effort: Pulmonary effort is normal. No respiratory distress.      Breath sounds: Normal breath sounds. No stridor. No wheezing, rhonchi or rales.   Chest:      Chest wall: No tenderness.   Abdominal:      General: Abdomen is flat. Bowel sounds are normal.      Palpations: Abdomen is soft. There is no mass.      Tenderness: There is no abdominal tenderness. There is no guarding or rebound.      Hernia: No hernia is present.   Musculoskeletal:         General: No swelling or tenderness. Normal range of motion.      Cervical back: Normal range of motion. No rigidity or tenderness.      Right lower leg: No edema.      Left lower leg: No edema.   Lymphadenopathy:      Cervical: No cervical adenopathy.   Skin:     General: Skin is warm and dry.      Coloration: Skin is not pale.      Findings: Rash present. No bruising or erythema.      Comments: Mildly erythematous rash at anterior aspects of neck along neckline.    Neurological:      General: No focal deficit present.      Mental Status: She is alert and oriented to person, place, and time.      Sensory: No sensory deficit.      Coordination: Coordination normal.      Gait: Gait normal.      Deep Tendon Reflexes: Reflexes normal.   Psychiatric:         Mood and Affect: Mood normal.         Behavior: Behavior normal.         Thought Content: Thought content normal.         Judgment: Judgment normal.         Assessment/Plan   Problem List Items Addressed This Visit       Multiple sclerosis (CMS/HCC)    Relevant Orders    Disability Placard    RESOLVED: Obesity, morbid (CMS/HCC)    Chronic  recurrent major depressive disorder (CMS/HCC)    Relevant Orders    3 Month Follow Up In [facility] - PCP    Seasonal affective disorder (CMS/HCC)     Other Visit Diagnoses       Routine general medical examination at health care facility    -  Primary    Class 2 severe obesity with serious comorbidity and body mass index (BMI) of 39.0 to 39.9 in adult, unspecified obesity type (CMS/HCC)        Screening for multiple conditions        Screening for cardiovascular condition        Relevant Orders    ECG 12 lead (Completed)    CT cardiac scoring wo IV contrast    Cardiac risk counseling        Need for vaccination        Relevant Orders    Pneumococcal conjugate vaccine 20-valent IM (Completed)    Need for hepatitis C screening test        Other problems related to lifestyle        Screening for colorectal cancer        Relevant Orders    Cologuard® colon cancer screening    Fecal Occult Bld Immunoassay    Screening for hyperlipidemia            Cherry tart juice to help reduce uric acid and possible gout flares

## 2023-04-12 NOTE — PROGRESS NOTES
"Subjective   Reason for Visit: Olga Bennett is an 72 y.o. female here for a Medicare Wellness visit.          Reviewed all medications by prescribing practitioner or clinical pharmacist (such as prescriptions, OTCs, herbal therapies and supplements) and documented in the medical record.    HPI    Patient Care Team:  Wendy Meraz MD as PCP - General  Wendy Meraz MD as PCP - [employer]vantage PCP     Review of Systems    Objective   Vitals:  /77   Pulse 80   Resp 18   Ht 1.537 m (5' 0.5\")   Wt 93.8 kg (206 lb 12.8 oz)   SpO2 96%   BMI 39.72 kg/m²       Physical Exam    Assessment/Plan   Problem List Items Addressed This Visit          Nervous    Multiple sclerosis (CMS/[facility])          "

## 2023-04-18 DIAGNOSIS — G57.93 NEUROPATHY INVOLVING BOTH LOWER EXTREMITIES: ICD-10-CM

## 2023-04-18 RX ORDER — PREGABALIN 75 MG/1
75 CAPSULE ORAL 3 TIMES DAILY
Qty: 90 CAPSULE | Refills: 0 | Status: SHIPPED | OUTPATIENT
Start: 2023-04-18 | End: 2023-04-20 | Stop reason: SDUPTHER

## 2023-04-20 ENCOUNTER — TELEPHONE (OUTPATIENT)
Dept: PRIMARY CARE | Facility: CLINIC | Age: 73
End: 2023-04-20
Payer: MEDICARE

## 2023-04-20 DIAGNOSIS — G57.93 NEUROPATHY INVOLVING BOTH LOWER EXTREMITIES: ICD-10-CM

## 2023-04-20 RX ORDER — PREGABALIN 75 MG/1
CAPSULE ORAL
Qty: 150 CAPSULE | Refills: 0 | Status: SHIPPED | OUTPATIENT
Start: 2023-04-20 | End: 2023-05-16

## 2023-04-20 NOTE — TELEPHONE ENCOUNTER
[facility] called because prescription pregabalin (Lyrica) 75 mg capsule order quantity was incorrect it should be 150 for 30 day supply not 90. They need you to send the corrected order

## 2023-04-21 ENCOUNTER — TELEPHONE (OUTPATIENT)
Dept: PRIMARY CARE | Facility: CLINIC | Age: 73
End: 2023-04-21
Payer: MEDICARE

## 2023-04-21 NOTE — TELEPHONE ENCOUNTER
Pharmacy called for a prior auth to be started stat! For Pregabalin 7mg on cover my meds.   Or the doctor can send in two different Rx on for patient to take 1 tab morning noon and bedtime and the second for 150mg at night.

## 2023-05-04 DIAGNOSIS — N32.81 OAB (OVERACTIVE BLADDER): ICD-10-CM

## 2023-05-04 RX ORDER — MIRABEGRON 25 MG/1
25 TABLET, FILM COATED, EXTENDED RELEASE ORAL DAILY
Qty: 90 TABLET | Refills: 0 | Status: SHIPPED | OUTPATIENT
Start: 2023-05-04 | End: 2023-08-02

## 2023-05-16 DIAGNOSIS — G57.93 NEUROPATHY INVOLVING BOTH LOWER EXTREMITIES: ICD-10-CM

## 2023-05-16 RX ORDER — PREGABALIN 75 MG/1
CAPSULE ORAL
Qty: 150 CAPSULE | Refills: 0 | Status: SHIPPED | OUTPATIENT
Start: 2023-05-16

## 2023-05-16 RX ORDER — AMITRIPTYLINE HYDROCHLORIDE 25 MG/1
TABLET, FILM COATED ORAL
Qty: 90 TABLET | Refills: 0 | Status: SHIPPED | OUTPATIENT
Start: 2023-05-16

## 2023-06-17 LAB — NONINV COLON CA DNA+OCC BLD SCRN STL QL: NEGATIVE

## 2023-07-12 ENCOUNTER — APPOINTMENT (OUTPATIENT)
Dept: PRIMARY CARE | Facility: CLINIC | Age: 73
End: 2023-07-12
Payer: MEDICARE

## 2023-07-24 DIAGNOSIS — K21.9 GASTROESOPHAGEAL REFLUX DISEASE, UNSPECIFIED WHETHER ESOPHAGITIS PRESENT: Primary | ICD-10-CM

## 2023-07-25 RX ORDER — PANTOPRAZOLE SODIUM 40 MG/1
TABLET, DELAYED RELEASE ORAL
Qty: 180 TABLET | Refills: 0 | Status: SHIPPED | OUTPATIENT
Start: 2023-07-25

## 2023-08-08 DIAGNOSIS — G57.93 NEUROPATHY INVOLVING BOTH LOWER EXTREMITIES: ICD-10-CM

## 2023-08-08 RX ORDER — AMITRIPTYLINE HYDROCHLORIDE 25 MG/1
TABLET, FILM COATED ORAL
Qty: 90 TABLET | Refills: 0 | OUTPATIENT
Start: 2023-08-08

## 2023-10-23 DIAGNOSIS — K21.9 GASTROESOPHAGEAL REFLUX DISEASE, UNSPECIFIED WHETHER ESOPHAGITIS PRESENT: ICD-10-CM

## 2023-10-24 RX ORDER — PANTOPRAZOLE SODIUM 40 MG/1
TABLET, DELAYED RELEASE ORAL
Qty: 180 TABLET | Refills: 0 | OUTPATIENT
Start: 2023-10-24